# Patient Record
Sex: MALE | Race: AMERICAN INDIAN OR ALASKA NATIVE | NOT HISPANIC OR LATINO | URBAN - METROPOLITAN AREA
[De-identification: names, ages, dates, MRNs, and addresses within clinical notes are randomized per-mention and may not be internally consistent; named-entity substitution may affect disease eponyms.]

---

## 2017-03-18 ENCOUNTER — OUTPATIENT (OUTPATIENT)
Dept: OUTPATIENT SERVICES | Facility: HOSPITAL | Age: 27
LOS: 1 days | Discharge: HOME | End: 2017-03-18

## 2017-06-27 DIAGNOSIS — E11.65 TYPE 2 DIABETES MELLITUS WITH HYPERGLYCEMIA: ICD-10-CM

## 2017-06-27 DIAGNOSIS — E78.4 OTHER HYPERLIPIDEMIA: ICD-10-CM

## 2017-06-27 DIAGNOSIS — A79.1 RICKETTSIALPOX DUE TO RICKETTSIA AKARI: ICD-10-CM

## 2017-12-05 ENCOUNTER — OUTPATIENT (OUTPATIENT)
Dept: OUTPATIENT SERVICES | Facility: HOSPITAL | Age: 27
LOS: 1 days | Discharge: HOME | End: 2017-12-05

## 2017-12-05 DIAGNOSIS — Z76.1 ENCOUNTER FOR HEALTH SUPERVISION AND CARE OF FOUNDLING: ICD-10-CM

## 2017-12-05 DIAGNOSIS — R73.09 OTHER ABNORMAL GLUCOSE: ICD-10-CM

## 2017-12-05 DIAGNOSIS — E78.00 PURE HYPERCHOLESTEROLEMIA, UNSPECIFIED: ICD-10-CM

## 2018-06-05 ENCOUNTER — OUTPATIENT (OUTPATIENT)
Dept: OUTPATIENT SERVICES | Facility: HOSPITAL | Age: 28
LOS: 1 days | Discharge: HOME | End: 2018-06-05

## 2018-06-05 DIAGNOSIS — E11.9 TYPE 2 DIABETES MELLITUS WITHOUT COMPLICATIONS: ICD-10-CM

## 2018-06-05 DIAGNOSIS — E78.00 PURE HYPERCHOLESTEROLEMIA, UNSPECIFIED: ICD-10-CM

## 2018-12-05 ENCOUNTER — OUTPATIENT (OUTPATIENT)
Dept: OUTPATIENT SERVICES | Facility: HOSPITAL | Age: 28
LOS: 1 days | Discharge: HOME | End: 2018-12-05

## 2018-12-05 DIAGNOSIS — E78.00 PURE HYPERCHOLESTEROLEMIA, UNSPECIFIED: ICD-10-CM

## 2018-12-05 DIAGNOSIS — Z00.00 ENCOUNTER FOR GENERAL ADULT MEDICAL EXAMINATION WITHOUT ABNORMAL FINDINGS: ICD-10-CM

## 2018-12-05 DIAGNOSIS — E11.9 TYPE 2 DIABETES MELLITUS WITHOUT COMPLICATIONS: ICD-10-CM

## 2019-06-04 ENCOUNTER — OUTPATIENT (OUTPATIENT)
Dept: OUTPATIENT SERVICES | Facility: HOSPITAL | Age: 29
LOS: 1 days | Discharge: HOME | End: 2019-06-04

## 2019-06-04 DIAGNOSIS — E11.9 TYPE 2 DIABETES MELLITUS WITHOUT COMPLICATIONS: ICD-10-CM

## 2019-06-04 DIAGNOSIS — Z76.1 ENCOUNTER FOR HEALTH SUPERVISION AND CARE OF FOUNDLING: ICD-10-CM

## 2019-12-02 ENCOUNTER — OUTPATIENT (OUTPATIENT)
Dept: OUTPATIENT SERVICES | Facility: HOSPITAL | Age: 29
LOS: 1 days | Discharge: HOME | End: 2019-12-02

## 2019-12-02 DIAGNOSIS — E78.00 PURE HYPERCHOLESTEROLEMIA, UNSPECIFIED: ICD-10-CM

## 2019-12-02 DIAGNOSIS — Z76.1 ENCOUNTER FOR HEALTH SUPERVISION AND CARE OF FOUNDLING: ICD-10-CM

## 2022-02-14 PROBLEM — Z00.00 ENCOUNTER FOR PREVENTIVE HEALTH EXAMINATION: Status: ACTIVE | Noted: 2022-02-14

## 2022-04-20 ENCOUNTER — APPOINTMENT (OUTPATIENT)
Dept: UROLOGY | Facility: CLINIC | Age: 32
End: 2022-04-20
Payer: COMMERCIAL

## 2022-04-20 VITALS
BODY MASS INDEX: 32.2 KG/M2 | HEART RATE: 82 BPM | SYSTOLIC BLOOD PRESSURE: 119 MMHG | HEIGHT: 71 IN | WEIGHT: 230 LBS | DIASTOLIC BLOOD PRESSURE: 84 MMHG

## 2022-04-20 DIAGNOSIS — Z78.9 OTHER SPECIFIED HEALTH STATUS: ICD-10-CM

## 2022-04-20 DIAGNOSIS — E78.00 PURE HYPERCHOLESTEROLEMIA, UNSPECIFIED: ICD-10-CM

## 2022-04-20 DIAGNOSIS — Z83.6 FAMILY HISTORY OF OTHER DISEASES OF THE RESPIRATORY SYSTEM: ICD-10-CM

## 2022-04-20 DIAGNOSIS — Z82.49 FAMILY HISTORY OF ISCHEMIC HEART DISEASE AND OTHER DISEASES OF THE CIRCULATORY SYSTEM: ICD-10-CM

## 2022-04-20 DIAGNOSIS — E11.9 TYPE 2 DIABETES MELLITUS W/OUT COMPLICATIONS: ICD-10-CM

## 2022-04-20 PROCEDURE — 99204 OFFICE O/P NEW MOD 45 MIN: CPT

## 2022-04-20 RX ORDER — METFORMIN HYDROCHLORIDE 500 MG/1
500 TABLET, COATED ORAL
Refills: 0 | Status: ACTIVE | COMMUNITY

## 2022-04-20 RX ORDER — ATORVASTATIN CALCIUM 20 MG/1
20 TABLET, FILM COATED ORAL
Refills: 0 | Status: ACTIVE | COMMUNITY

## 2022-04-20 NOTE — ASSESSMENT
[FreeTextEntry1] : He has over 1 year of frequent, pretty much daily sex without conception with the semen analysis showing poor motility and poor morphology.  He had another semen analysis I do not have the results yet.\par \par I will send him for morning bloods, scrotal ultrasound, he will come in to review and then we will see if there is something that we can do to improve his sperm.

## 2022-04-20 NOTE — LETTER BODY
[Dear  ___] : Dear  [unfilled], [Consult Letter:] : I had the pleasure of evaluating your patient, [unfilled]. [Please see my note below.] : Please see my note below. [Consult Closing:] : Thank you very much for allowing me to participate in the care of this patient.  If you have any questions, please do not hesitate to contact me. [Sincerely,] : Sincerely, [FreeTextEntry2] : Sultan Landen BUI\par [address] [place] [address]\[place] [address]

## 2022-04-20 NOTE — LETTER HEADER
[FreeTextEntry3] : Waleska Caruso M.D.\par Director Emeritus of Urology\par [facility]\par [address] [place] [address], [address]\[place] [address]

## 2022-04-20 NOTE — PHYSICAL EXAM
[General Appearance - Well Developed] : well developed [General Appearance - Well Nourished] : well nourished [Normal Appearance] : normal appearance [Well Groomed] : well groomed [General Appearance - In No Acute Distress] : no acute distress [Heart Rate And Rhythm] : Heart rate and rhythm were normal [Edema] : no peripheral edema [Respiration, Rhythm And Depth] : normal respiratory rhythm and effort [Exaggerated Use Of Accessory Muscles For Inspiration] : no accessory muscle use [Auscultation Breath Sounds / Voice Sounds] : lungs were clear to auscultation bilaterally [Abdomen Soft] : soft [Abdomen Tenderness] : non-tender [Abdomen Hernia] : no hernia was discovered [Costovertebral Angle Tenderness] : no ~M costovertebral angle tenderness [Urethral Meatus] : meatus normal [Penis Abnormality] : normal circumcised penis [Epididymis] : the epididymides were normal [Normal Station and Gait] : the gait and station were normal for the patient's age [] : no rash [FreeTextEntry1] : Deep tendon reflexes were normal [Oriented To Time, Place, And Person] : oriented to person, place, and time [Affect] : the affect was normal [Mood] : the mood was normal [Not Anxious] : not anxious

## 2022-04-20 NOTE — HISTORY OF PRESENT ILLNESS
[FreeTextEntry1] : Shane is a 31-year-old [place] male born September 14, 1990.  He got  about 3 years ago what they had limited contact as she was in [place] and though he did go back when he could not spend about a month or so there it was not until about 15 months ago he was able to bring her here.  Since then they have been having regular relations on a frequent basis especially when she was ovulating and they have not yet conceived.\par \par He has no other partners before her she had no other partners before hand.\par \par Occasionally on the shaft of the penis just behind the mucosal epithelial border he gets skin irritation that does not hurt when they are having sex and right now he says it is healed.  He is wondering what it is.\par \par He has no past urologic history no history of cryptorchidism, no one ever told him he had a hydrocele or varicocele, there is no history of those STDs.  He is a diabetic hypertensive medication and its control.  He said he has no known drug allergies and in general feels he is in good health.\par There is no history of injuries or accidents radiation treatment, he has no trouble getting an erection and having sex with his wife is willing and he is never seen the need to see a therapist.  He does not smoke does not drink he works as an [employer]  but does not use recreational drugs.  I have relations almost daily they do not need to use exogenous lubrication she knows when she ovulates.  They did use the sticks and her obstetrician did measure it is now essentially by count as she ovulates about day 14 after she starts her cycle.  They had an evaluation at [facility] which showed suboptimal sperm they are in the process of doing an IUI, a second semen analysis was done last month we do not have that yet.\par \par Her cycle is regular 5 days out of a month and she has pain the first day.  He is 1 of 3.  His brother has cerebral palsy and the sister has autism.  His wife is 1 of 3 she has 1 brother and 1 sister both of them have kids. [Currently Experiencing ___] :  [unfilled]

## 2022-05-20 LAB
ALBUMIN SERPL ELPH-MCNC: 4.8 G/DL
ALP BLD-CCNC: 75 U/L
ALT SERPL-CCNC: 73 U/L
AST SERPL-CCNC: 37 U/L
BASOPHILS # BLD AUTO: 0.04 K/UL
BASOPHILS NFR BLD AUTO: 0.7 %
BILIRUB DIRECT SERPL-MCNC: <0.2 MG/DL
BILIRUB INDIRECT SERPL-MCNC: NORMAL MG/DL
BILIRUB SERPL-MCNC: 0.4 MG/DL
EOSINOPHIL # BLD AUTO: 0.29 K/UL
EOSINOPHIL NFR BLD AUTO: 4.9 %
HCT VFR BLD CALC: 49.2 %
HGB BLD-MCNC: 15.8 G/DL
IMM GRANULOCYTES NFR BLD AUTO: 0.3 %
LYMPHOCYTES # BLD AUTO: 2.61 K/UL
LYMPHOCYTES NFR BLD AUTO: 44.2 %
MAN DIFF?: NORMAL
MCHC RBC-ENTMCNC: 28.8 PG
MCHC RBC-ENTMCNC: 32.1 G/DL
MCV RBC AUTO: 89.8 FL
MONOCYTES # BLD AUTO: 0.35 K/UL
MONOCYTES NFR BLD AUTO: 5.9 %
NEUTROPHILS # BLD AUTO: 2.6 K/UL
NEUTROPHILS NFR BLD AUTO: 44 %
PLATELET # BLD AUTO: 275 K/UL
PROT SERPL-MCNC: 7.7 G/DL
RBC # BLD: 5.48 M/UL
RBC # FLD: 14.1 %
WBC # FLD AUTO: 5.91 K/UL

## 2022-05-23 LAB
ESTRADIOL SERPL-MCNC: 12 PG/ML
FSH SERPL-MCNC: 4 IU/L
LH SERPL-ACNC: 4.4 IU/L
PROLACTIN SERPL-MCNC: 10.4 NG/ML
TESTOST FREE SERPL-MCNC: 6.4 PG/ML
TESTOST SERPL-MCNC: 158 NG/DL

## 2022-05-26 ENCOUNTER — APPOINTMENT (OUTPATIENT)
Dept: UROLOGY | Facility: CLINIC | Age: 32
End: 2022-05-26
Payer: COMMERCIAL

## 2022-05-26 ENCOUNTER — TRANSCRIPTION ENCOUNTER (OUTPATIENT)
Age: 32
End: 2022-05-26

## 2022-05-26 VITALS
WEIGHT: 234 LBS | SYSTOLIC BLOOD PRESSURE: 105 MMHG | DIASTOLIC BLOOD PRESSURE: 73 MMHG | HEIGHT: 71 IN | BODY MASS INDEX: 32.76 KG/M2

## 2022-05-26 LAB
SHBG SERPL-SCNC: 22.3 NMOL/L
TESTOSTERONE FREE/WEAKLY BND: 48.3 NG/DL
TESTOSTERONE TOTAL S: 152 NG/DL
TESTOSTERONE, % FREE/WEAKLY BND: 31.8 %

## 2022-05-26 PROCEDURE — 99214 OFFICE O/P EST MOD 30 MIN: CPT

## 2022-05-26 NOTE — HISTORY OF PRESENT ILLNESS
[Currently Experiencing ___] :  [unfilled] [FreeTextEntry1] : Shane is a 31-year-old male born September 14, 1990 who we first saw on April 20, 2022.  We have been  following him for infertility.\par \par He underwent 1 IUI and presents to review his semen analysis results from that study, post wash.  Unfortunately it was unsuccessful.\par \par Additionally presents to review his scrotal ultrasound as well as other analysis and blood work.

## 2022-05-26 NOTE — LETTER BODY
[Dear  ___] : Dear  [unfilled], [Consult Letter:] : I had the pleasure of evaluating your patient, [unfilled]. [Please see my note below.] : Please see my note below. [Consult Closing:] : Thank you very much for allowing me to participate in the care of this patient.  If you have any questions, please do not hesitate to contact me. [Sincerely,] : Sincerely, [FreeTextEntry2] : Sultan Landen BUI\par [address] [place] [address]\[place] [address]

## 2022-05-26 NOTE — LETTER HEADER
[FreeTextEntry3] : Waleska Caruso M.D.\par Director Emeritus of Urology\par [facility]\par [address] [place] [address], [address]\[place] [address]

## 2022-05-26 NOTE — ASSESSMENT
[FreeTextEntry1] : His semen analyses are odd and that he has 1 that is almost completely azoospermic and then 84 million in the next, and that was post wash.  He has low motility and 1 unsuccessful IUI.\par \par He is having an IUI next week and he will obtain a repeat semen analysis at that time and bring us the results.\par \par Concerning his hormones again, there is a murky picture in that his testosterone and estradiol are low he had his FSH and LH are in the median range.  There may be a pituitary issue here or this could be a sampling error.  Therefore we will obtain repeat hormones and if still the same consider MRI.

## 2022-06-25 ENCOUNTER — NON-APPOINTMENT (OUTPATIENT)
Age: 32
End: 2022-06-25

## 2022-06-28 LAB
ALBUMIN SERPL ELPH-MCNC: 5 G/DL
ALP BLD-CCNC: 78 U/L
ALT SERPL-CCNC: 82 U/L
AST SERPL-CCNC: 41 U/L
BASOPHILS # BLD AUTO: 0.04 K/UL
BASOPHILS NFR BLD AUTO: 0.6 %
BILIRUB DIRECT SERPL-MCNC: <0.2 MG/DL
BILIRUB INDIRECT SERPL-MCNC: NORMAL MG/DL
BILIRUB SERPL-MCNC: 0.4 MG/DL
EOSINOPHIL # BLD AUTO: 0.3 K/UL
EOSINOPHIL NFR BLD AUTO: 4.2 %
ESTRADIOL SERPL-MCNC: 14 PG/ML
FSH SERPL-MCNC: 3.3 IU/L
HCT VFR BLD CALC: 48.1 %
HGB BLD-MCNC: 15.6 G/DL
IMM GRANULOCYTES NFR BLD AUTO: 0.3 %
LH SERPL-ACNC: 1.3 IU/L
LYMPHOCYTES # BLD AUTO: 3.19 K/UL
LYMPHOCYTES NFR BLD AUTO: 44.4 %
MAN DIFF?: NORMAL
MCHC RBC-ENTMCNC: 29.1 PG
MCHC RBC-ENTMCNC: 32.4 G/DL
MCV RBC AUTO: 89.7 FL
MONOCYTES # BLD AUTO: 0.37 K/UL
MONOCYTES NFR BLD AUTO: 5.1 %
NEUTROPHILS # BLD AUTO: 3.27 K/UL
NEUTROPHILS NFR BLD AUTO: 45.4 %
PLATELET # BLD AUTO: 291 K/UL
PROLACTIN SERPL-MCNC: 10.9 NG/ML
PROT SERPL-MCNC: 8 G/DL
RBC # BLD: 5.36 M/UL
RBC # FLD: 14.2 %
SHBG SERPL-SCNC: 20.4 NMOL/L
TESTOST FREE SERPL-MCNC: 6.4 PG/ML
TESTOST SERPL-MCNC: 210 NG/DL
TESTOSTERONE FREE/WEAKLY BND: 13.2 NG/DL
TESTOSTERONE TOTAL S: 188 NG/DL
TESTOSTERONE, % FREE/WEAKLY BND: 7 %
WBC # FLD AUTO: 7.19 K/UL

## 2022-06-30 ENCOUNTER — APPOINTMENT (OUTPATIENT)
Dept: UROLOGY | Facility: CLINIC | Age: 32
End: 2022-06-30

## 2022-06-30 VITALS
BODY MASS INDEX: 32.76 KG/M2 | WEIGHT: 234 LBS | HEIGHT: 71 IN | HEART RATE: 84 BPM | SYSTOLIC BLOOD PRESSURE: 140 MMHG | DIASTOLIC BLOOD PRESSURE: 87 MMHG

## 2022-06-30 PROCEDURE — 99214 OFFICE O/P EST MOD 30 MIN: CPT

## 2022-06-30 RX ORDER — LANCETS
EACH MISCELLANEOUS
Qty: 200 | Refills: 0 | Status: ACTIVE | COMMUNITY
Start: 2022-05-25

## 2022-06-30 NOTE — LETTER HEADER
[FreeTextEntry3] : Waleksa Caruso M.D.\par Director Emeritus of Urology\par Director of Male Infertility\par [facility]\par [address] [place] [address], [facility] [address]\[place] [address]

## 2022-06-30 NOTE — ASSESSMENT
[FreeTextEntry1] : His prolactin is normal but despite having an estradiol of 12-14 (11–43) his LH is only 1.3 on the last bloods (1.7–8.6).  This is pituitary dysfunction.  I theoretically can put him on Clomid and hyperdrive his pituitary but with such a low estradiol I am not sure that estrogen receptor blockers can be that useful.  The other question is why this is happening.  Going to send him for an MRI of the pituitary just to make sure there is not a nonfunctioning tumor that is interfering with the function.  If that is normal we can try Clomid to try and raise the LH and FSH and see if we can get him better sperm.  The semen analysis did show good numbers but the motility was quite poor and even what was motile was with a poor degree of motility.  As well morphology is suboptimal.

## 2022-06-30 NOTE — HISTORY OF PRESENT ILLNESS
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  They have 1 IUI unfortunately unsuccessful we will last seen and looked at his ultrasound and blood work and they did not make sense.  He had 1 sample that had 84,000,000 ms is chronic he had low motility and an unsuccessful IUI.  They are going to do another\par He will get a semen analysis that\par And bring us the results.\par \par With respect to his hormones testosterone and estradiol were low on the FSH and LH were only in the median range and I could not tell if this is a sampling error pituitary so we decided to repeat the hormones and if we will consider an MRI.  Repeat blood test was done on Felicia 15, semen analysis was done on June 8 and he is here to review. [Currently Experiencing ___] :  [unfilled]

## 2022-06-30 NOTE — LETTER BODY
[Dear  ___] : Dear  [unfilled], [Consult Letter:] : I had the pleasure of evaluating your patient, [unfilled]. [Please see my note below.] : Please see my note below. [Consult Closing:] : Thank you very much for allowing me to participate in the care of this patient.  If you have any questions, please do not hesitate to contact me. [Sincerely,] : Sincerely, [FreeTextEntry2] : Sultan Landen BUI\par [address] [place] [address]\[place] [address]

## 2022-08-01 LAB
CREAT SERPL-MCNC: 0.8 MG/DL
EGFR: 121 ML/MIN/1.73M2

## 2022-08-08 ENCOUNTER — RESULT REVIEW (OUTPATIENT)
Age: 32
End: 2022-08-08

## 2022-08-08 ENCOUNTER — OUTPATIENT (OUTPATIENT)
Dept: OUTPATIENT SERVICES | Facility: HOSPITAL | Age: 32
LOS: 1 days | Discharge: HOME | End: 2022-08-08

## 2022-08-08 DIAGNOSIS — N46.9 MALE INFERTILITY, UNSPECIFIED: ICD-10-CM

## 2022-08-08 DIAGNOSIS — R79.89 OTHER SPECIFIED ABNORMAL FINDINGS OF BLOOD CHEMISTRY: ICD-10-CM

## 2022-08-08 PROCEDURE — 70553 MRI BRAIN STEM W/O & W/DYE: CPT | Mod: 26

## 2022-08-15 ENCOUNTER — APPOINTMENT (OUTPATIENT)
Dept: UROLOGY | Facility: CLINIC | Age: 32
End: 2022-08-15

## 2022-08-15 VITALS
SYSTOLIC BLOOD PRESSURE: 123 MMHG | DIASTOLIC BLOOD PRESSURE: 80 MMHG | BODY MASS INDEX: 32.62 KG/M2 | HEIGHT: 71 IN | WEIGHT: 233 LBS | HEART RATE: 89 BPM

## 2022-08-15 DIAGNOSIS — N46.9 MALE INFERTILITY, UNSPECIFIED: ICD-10-CM

## 2022-08-15 DIAGNOSIS — E23.7 DISORDER OF PITUITARY GLAND, UNSPECIFIED: ICD-10-CM

## 2022-08-15 DIAGNOSIS — R79.89 OTHER SPECIFIED ABNORMAL FINDINGS OF BLOOD CHEMISTRY: ICD-10-CM

## 2022-08-15 PROCEDURE — 99214 OFFICE O/P EST MOD 30 MIN: CPT

## 2022-08-15 RX ORDER — LISINOPRIL 10 MG/1
10 TABLET ORAL
Qty: 90 | Refills: 0 | Status: ACTIVE | COMMUNITY
Start: 2022-08-04

## 2022-08-15 RX ORDER — CLOMIPHENE CITRATE 50 MG/1
50 TABLET ORAL
Qty: 15 | Refills: 1 | Status: ACTIVE | COMMUNITY
Start: 2022-08-15 | End: 1900-01-01

## 2022-08-15 NOTE — ASSESSMENT
[FreeTextEntry1] : He has a hypofunctioning pituitary gland with a negative MRI.  We will start him on Clomid 50 mg every other day.  Obtain blood work in 3 weeks and follow-up in 4 weeks for review.  All usage, dosage, mechanism of action, and adverse events of been reviewed.  He understands this is an off label use of medication.\par \par

## 2022-08-15 NOTE — LETTER BODY
[Dear  ___] : Dear  [unfilled], [Courtesy Letter:] : I had the pleasure of seeing your patient, [unfilled], in my office today. [Please see my note below.] : Please see my note below. [Sincerely,] : Sincerely, [FreeTextEntry2] : Sultan Landen BUI\par [address] [place] [address]\[place] [address]

## 2022-08-15 NOTE — HISTORY OF PRESENT ILLNESS
[Currently Experiencing ___] :  [unfilled] [FreeTextEntry1] : Shane is a 31-year-old male born September 14, 1990 last seen on June 30, 2022 who we have been following for subfertility.\par \par He has, likely a hypofunctioning pituitary gland with decreased FSH/LH, estradiol, and testosterone.  We sent him for an MRI of the pituitary to see if this was anatomical or just functional\par \par He presents today to review his MRI of his pituitary to rule out malignant process.\par \par He reports that in the interim he and his wife underwent egg retrieval and fertilization, they have 7 embryos frozen.  They are scheduled to undergo transfer next month.\par \par

## 2022-08-15 NOTE — LETTER HEADER
[FreeTextEntry3] : Waleska Caruso M.D.\par Director Emeritus of Urology\par Director of Male Infertility\par [facility]\par [address] [place] [address], [facility] [address]\[place] [address]

## 2022-08-15 NOTE — END OF VISIT
[FreeTextEntry3] : I, Dr. Caruso, personally performed the evaluation and management (E/M) services for this established patient who presents today with (a) new problem(s)/exacerbation of (an) existing condition(s).  That E/M includes conducting the examination, assessing all new/exacerbated conditions, and establishing a new plan of care.  Today, my ACP, Alan Roy was here to observe my evaluation and management services for this new problem/exacerbated condition to be followed going forward.

## 2022-09-19 ENCOUNTER — APPOINTMENT (OUTPATIENT)
Dept: UROLOGY | Facility: CLINIC | Age: 32
End: 2022-09-19

## 2023-04-06 ENCOUNTER — APPOINTMENT (OUTPATIENT)
Dept: PULMONOLOGY | Facility: CLINIC | Age: 33
End: 2023-04-06

## 2024-06-20 ENCOUNTER — NON-APPOINTMENT (OUTPATIENT)
Age: 34
End: 2024-06-20

## 2024-12-20 ENCOUNTER — NON-APPOINTMENT (OUTPATIENT)
Age: 34
End: 2024-12-20

## 2024-12-23 ENCOUNTER — NON-APPOINTMENT (OUTPATIENT)
Age: 34
End: 2024-12-23

## 2024-12-24 ENCOUNTER — NON-APPOINTMENT (OUTPATIENT)
Age: 34
End: 2024-12-24

## 2025-02-28 ENCOUNTER — APPOINTMENT (OUTPATIENT)
Dept: UROLOGY | Facility: CLINIC | Age: 35
End: 2025-02-28
Payer: COMMERCIAL

## 2025-02-28 VITALS
SYSTOLIC BLOOD PRESSURE: 130 MMHG | OXYGEN SATURATION: 97 % | HEART RATE: 70 BPM | DIASTOLIC BLOOD PRESSURE: 85 MMHG | BODY MASS INDEX: 31.92 KG/M2 | RESPIRATION RATE: 18 BRPM | WEIGHT: 228 LBS | HEIGHT: 71 IN

## 2025-02-28 DIAGNOSIS — E23.7 DISORDER OF PITUITARY GLAND, UNSPECIFIED: ICD-10-CM

## 2025-02-28 DIAGNOSIS — N46.9 MALE INFERTILITY, UNSPECIFIED: ICD-10-CM

## 2025-02-28 DIAGNOSIS — R79.89 OTHER SPECIFIED ABNORMAL FINDINGS OF BLOOD CHEMISTRY: ICD-10-CM

## 2025-02-28 PROCEDURE — 99214 OFFICE O/P EST MOD 30 MIN: CPT

## 2025-02-28 PROCEDURE — G2211 COMPLEX E/M VISIT ADD ON: CPT | Mod: NC

## 2025-04-04 ENCOUNTER — APPOINTMENT (OUTPATIENT)
Dept: UROLOGY | Facility: CLINIC | Age: 35
End: 2025-04-04
Payer: COMMERCIAL

## 2025-04-04 VITALS
HEIGHT: 71 IN | WEIGHT: 225.5 LBS | DIASTOLIC BLOOD PRESSURE: 85 MMHG | OXYGEN SATURATION: 98 % | SYSTOLIC BLOOD PRESSURE: 122 MMHG | HEART RATE: 76 BPM | BODY MASS INDEX: 31.57 KG/M2 | RESPIRATION RATE: 18 BRPM | TEMPERATURE: 98.4 F

## 2025-04-04 PROCEDURE — 99214 OFFICE O/P EST MOD 30 MIN: CPT
